# Patient Record
(demographics unavailable — no encounter records)

---

## 2024-12-05 NOTE — HISTORY OF PRESENT ILLNESS
[de-identified] : PREOPERATIVE CLEARANCE [FreeTextEntry6] : 2nd stage of BILATERAL BREAST AUGMENTATION ON 12/19/24 AT [facility]

## 2024-12-05 NOTE — HISTORY OF PRESENT ILLNESS
[de-identified] : PREOPERATIVE CLEARANCE [FreeTextEntry6] : 2nd stage of BILATERAL BREAST AUGMENTATION ON 12/19/24 AT [facility]

## 2025-05-15 NOTE — HISTORY OF PRESENT ILLNESS
[de-identified] : UTI [FreeTextEntry6] : 18 yr old F with UTI concerns. Started this morning with excessive urination, some dysuria, abdominal pain, and blood in urine since afternoon. Denies back pain and fever. Was recently treated for UTI last month with Nitrofurantoin. Unsure of what bacteria grew on culture.   Using a lot of soap to shower. Not drinking water well. Some constipation. Endorses SA with partner, voiding after. No changes in discharge, no dyspareunia.

## 2025-05-15 NOTE — DISCUSSION/SUMMARY
[FreeTextEntry1] : 18 yr old F with acute UTI with blood, protein, leuk est and nitrites in urine. Afebrile.  Plan: - Ciprofloxacin BID x5 days - Encourage increased PO of fluids, proper hygiene  - F/u urine cx

## 2025-05-15 NOTE — PHYSICAL EXAM
[Tired appearing] : not tired appearing [Lethargic] : not lethargic [Soft] : soft [Tender] : tender [Distended] : nondistended [Normal Bowel Sounds] : normal bowel sounds [Hepatosplenomegaly] : no hepatosplenomegaly [NL] : warm, clear [FreeTextEntry9] : Suprapubic tenderness. No CVA tenderness.